# Patient Record
Sex: FEMALE | Race: WHITE | HISPANIC OR LATINO | Employment: PART TIME | URBAN - METROPOLITAN AREA
[De-identification: names, ages, dates, MRNs, and addresses within clinical notes are randomized per-mention and may not be internally consistent; named-entity substitution may affect disease eponyms.]

---

## 2017-02-08 ENCOUNTER — TELEPHONE (OUTPATIENT)
Dept: MEDICAL GROUP | Facility: PHYSICIAN GROUP | Age: 37
End: 2017-02-08

## 2017-02-08 NOTE — TELEPHONE ENCOUNTER
1. Caller Name: Patient                                         Call Back Number: [phone] (home)         Patient approves a detailed voicemail message: yes    2. SPECIFIC Action To Be Taken: Order diagnostic mammogram and Ultra sound this is a follow up from a year ago    3. Diagnosis/Clinical Reason for Request: Follow up    4. Specialty & Provider Name/Lab/Imaging Location: [facility]    5. Is appointment scheduled for requested order/referral: no    Patient informed they will receive a return phone call from the office ONLY if there are any questions before processing their request. Advised to call back if they haven't received a call from the referral department in 5 days.

## 2017-03-03 NOTE — TELEPHONE ENCOUNTER
Was the patient seen in the last year in this department? Yes     Does patient have an active prescription for medications requested? Yes     Received Request Via: Patient     Last office visit 02/18/16  Last labs 02/18/16

## 2017-03-06 RX ORDER — MONTELUKAST SODIUM 10 MG/1
10 TABLET ORAL DAILY
Qty: 30 TAB | Refills: 11 | OUTPATIENT
Start: 2017-03-06

## 2017-03-07 ENCOUNTER — HOSPITAL ENCOUNTER (OUTPATIENT)
Dept: RADIOLOGY | Facility: MEDICAL CENTER | Age: 37
End: 2017-03-07
Attending: FAMILY MEDICINE
Payer: COMMERCIAL

## 2017-03-07 DIAGNOSIS — N63.0 BREAST LUMP IN FEMALE: ICD-10-CM

## 2017-03-07 PROCEDURE — G0204 DX MAMMO INCL CAD BI: HCPCS

## 2017-03-07 PROCEDURE — 76642 ULTRASOUND BREAST LIMITED: CPT | Mod: LT

## 2017-03-21 ENCOUNTER — OFFICE VISIT (OUTPATIENT)
Dept: MEDICAL GROUP | Facility: PHYSICIAN GROUP | Age: 37
End: 2017-03-21
Payer: COMMERCIAL

## 2017-03-21 VITALS
OXYGEN SATURATION: 98 % | HEIGHT: 65 IN | HEART RATE: 76 BPM | WEIGHT: 147 LBS | TEMPERATURE: 98.2 F | SYSTOLIC BLOOD PRESSURE: 100 MMHG | BODY MASS INDEX: 24.49 KG/M2 | RESPIRATION RATE: 16 BRPM | DIASTOLIC BLOOD PRESSURE: 60 MMHG

## 2017-03-21 DIAGNOSIS — R10.9 RIGHT FLANK PAIN: ICD-10-CM

## 2017-03-21 DIAGNOSIS — N63.0 BREAST LUMP IN FEMALE: ICD-10-CM

## 2017-03-21 DIAGNOSIS — J30.1 NON-SEASONAL ALLERGIC RHINITIS DUE TO POLLEN: ICD-10-CM

## 2017-03-21 DIAGNOSIS — J30.1 ALLERGIC RHINITIS DUE TO POLLEN, UNSPECIFIED RHINITIS SEASONALITY: ICD-10-CM

## 2017-03-21 PROCEDURE — 99214 OFFICE O/P EST MOD 30 MIN: CPT | Performed by: FAMILY MEDICINE

## 2017-03-21 RX ORDER — MONTELUKAST SODIUM 10 MG/1
10 TABLET ORAL DAILY
Qty: 30 TAB | Refills: 11 | Status: CANCELLED | OUTPATIENT
Start: 2017-03-21

## 2017-03-21 RX ORDER — MONTELUKAST SODIUM 10 MG/1
10 TABLET ORAL DAILY
Qty: 30 TAB | Refills: 11 | Status: SHIPPED | OUTPATIENT
Start: 2017-03-21 | End: 2019-10-16 | Stop reason: SDUPTHER

## 2017-03-21 ASSESSMENT — ENCOUNTER SYMPTOMS
EYES NEGATIVE: 1
COUGH: 0
PALPITATIONS: 0
CARDIOVASCULAR NEGATIVE: 1
CHILLS: 0
GASTROINTESTINAL NEGATIVE: 1
FEVER: 0
BACK PAIN: 1
CONSTIPATION: 0
HEADACHES: 0
PSYCHIATRIC NEGATIVE: 1
NEUROLOGICAL NEGATIVE: 1
RESPIRATORY NEGATIVE: 1
DIZZINESS: 0
HEMOPTYSIS: 0
MYALGIAS: 1
NECK PAIN: 0
CONSTITUTIONAL NEGATIVE: 1

## 2017-03-21 NOTE — PROGRESS NOTES
Subjective:      Ana Luz is a 36 y.o. female who presents with Medication Refill            HPI Comments: 1. Non-seasonal allergic rhinitis due to pollen  Doing well on current meds  - montelukast (SINGULAIR) 10 MG Tab; Take 1 Tab by mouth every day.  Dispense: 30 Tab; Refill: 11    2. Breast lump in female  Had recent mammo and usg and stable appearance and rec 2 year f/u    3. Right flank pain  Pain in the right flank at times and wants scan to r/o stones  - US-RENAL; Future    No past medical history on file.  Past Surgical History:    BREAST BIOPSY                                    2009            Comment:benign    TUBAL COAGULATION LAPAROSCOPIC BILATERAL         2009          US-NEEDLE CORE BX-BREAST PANEL                                 Smoking Status: Never Smoker                      Alcohol Use: No              Review of patient's family history indicates:    Hypertension                   Mother                    Heart Disease                  Father                    Hypertension                   Father                    Cancer                         Maternal Aunt               Comment: breast      Current outpatient prescriptions: •  montelukast (SINGULAIR) 10 MG Tab, Take 1 Tab by mouth every day., Disp: 30 Tab, Rfl: 11•  ciprofloxacin (CIPRO) 500 MG Tab, Take 1 Tab by mouth 2 times a day., Disp: 20 Tab, Rfl: 0    Assessment/plan: diagnoses listed as above in problem list. Patient will continue with current medications/diet/lifestyle modifications    Patient will continue with current medication regimen, advised on taking meds every day, f/u in 3 mo.            Review of Systems   Constitutional: Negative.  Negative for fever and chills.        No past medical history on file.  Past Surgical History:    BREAST BIOPSY                                    2009            Comment:benign    TUBAL COAGULATION LAPAROSCOPIC BILATERAL         2009          US-NEEDLE CORE BX-BREAST PANEL                 "                 Smoking Status: Never Smoker                      Alcohol Use: No              Review of patient's family history indicates:    Hypertension                   Mother                    Heart Disease                  Father                    Hypertension                   Father                    Cancer                         Maternal Aunt               Comment: breast     HENT: Negative.    Eyes: Negative.    Respiratory: Negative.  Negative for cough and hemoptysis.    Cardiovascular: Negative.  Negative for chest pain and palpitations.   Gastrointestinal: Negative.  Negative for constipation.   Genitourinary: Negative.  Negative for dysuria and urgency.   Musculoskeletal: Positive for myalgias and back pain. Negative for neck pain.   Skin: Negative.  Negative for rash.   Neurological: Negative.  Negative for dizziness and headaches.   Endo/Heme/Allergies: Negative.    Psychiatric/Behavioral: Negative.  Negative for suicidal ideas.          Objective:     /60 mmHg  Pulse 76  Temp(Src) 36.8 °C (98.2 °F)  Resp 16  Ht 1.651 m (5' 5\")  Wt 66.679 kg (147 lb)  BMI 24.46 kg/m2  SpO2 98%     Physical Exam   Constitutional: She is oriented to person, place, and time. No distress.   HENT:   Head: Normocephalic and atraumatic.   Right Ear: External ear normal.   Left Ear: External ear normal.   Nose: Nose normal.   Mouth/Throat: Oropharynx is clear and moist. No oropharyngeal exudate.   Eyes: Pupils are equal, round, and reactive to light. Right eye exhibits no discharge. Left eye exhibits no discharge. No scleral icterus.   Neck: Normal range of motion. Neck supple. No JVD present. No tracheal deviation present. No thyromegaly present.   Cardiovascular: Normal rate, regular rhythm, normal heart sounds and intact distal pulses.  Exam reveals no gallop and no friction rub.    No murmur heard.  Pulmonary/Chest: Effort normal and breath sounds normal. No stridor. No respiratory distress. She has " no wheezes. She has no rales. She exhibits no tenderness.   Abdominal: Soft. She exhibits no distension. There is no tenderness.   Lymphadenopathy:     She has no cervical adenopathy.   Neurological: She is alert and oriented to person, place, and time.   Skin: Skin is warm and dry. She is not diaphoretic.   Psychiatric: Judgment normal.   Nursing note and vitals reviewed.              Assessment/Plan:     1. Non-seasonal allergic rhinitis due to pollen    - montelukast (SINGULAIR) 10 MG Tab; Take 1 Tab by mouth every day.  Dispense: 30 Tab; Refill: 11

## 2017-03-22 ENCOUNTER — TELEPHONE (OUTPATIENT)
Dept: MEDICAL GROUP | Facility: PHYSICIAN GROUP | Age: 37
End: 2017-03-22

## 2017-03-23 NOTE — TELEPHONE ENCOUNTER
----- Message from Guillermo Chahal M.D. sent at 3/22/2017 11:16 AM PDT -----  Labs ok no new meds needed

## 2017-03-23 NOTE — TELEPHONE ENCOUNTER
Phone Number Called: [phone] (home)       Message: Patient informed of results    Left Message for patient to call back: no

## 2017-03-24 ENCOUNTER — TELEPHONE (OUTPATIENT)
Dept: MEDICAL GROUP | Facility: PHYSICIAN GROUP | Age: 37
End: 2017-03-24

## 2017-03-24 NOTE — TELEPHONE ENCOUNTER
Phone Number Called: [phone] (home)       Message: Pt informed of results    Left Message for patient to call back: no

## 2017-03-29 ENCOUNTER — TELEPHONE (OUTPATIENT)
Dept: MEDICAL GROUP | Facility: PHYSICIAN GROUP | Age: 37
End: 2017-03-29

## 2017-03-29 NOTE — TELEPHONE ENCOUNTER
Phone Number Called: [phone] (home)       Message: Patient informed of results    Left Message for patient to call back: no

## 2017-03-29 NOTE — TELEPHONE ENCOUNTER
----- Message from Lakia Alatorre PA-C sent at 3/29/2017  9:15 AM PDT -----  I reviewed labs. Everything is within normal limits and looks good. Return for follow up as scheduled.

## 2017-04-04 ENCOUNTER — TELEPHONE (OUTPATIENT)
Dept: MEDICAL GROUP | Facility: PHYSICIAN GROUP | Age: 37
End: 2017-04-04

## 2017-04-04 NOTE — TELEPHONE ENCOUNTER
Phone Number Called: [phone] (home)       Message: Informed pt that tests were normal      Left Message for patient to call back: no

## 2017-10-31 PROBLEM — B00.2 ORAL HERPES SIMPLEX INFECTION: Status: ACTIVE | Noted: 2017-10-31

## 2017-10-31 PROBLEM — H10.10 ALLERGIC RHINOCONJUNCTIVITIS: Status: ACTIVE | Noted: 2017-10-31

## 2017-10-31 PROBLEM — J30.9 ALLERGIC RHINOCONJUNCTIVITIS: Status: ACTIVE | Noted: 2017-10-31

## 2018-11-16 PROBLEM — D53.9 MACROCYTIC ANEMIA: Status: ACTIVE | Noted: 2018-11-16

## 2019-10-07 PROBLEM — Z53.21 PATIENT LEFT WITHOUT BEING SEEN: Status: ACTIVE | Noted: 2019-10-07

## 2019-10-07 PROBLEM — Z88.9 H/O SEASONAL ALLERGIES: Status: ACTIVE | Noted: 2019-10-07

## 2019-10-07 PROBLEM — J31.0 RHINITIS: Status: ACTIVE | Noted: 2019-10-07

## 2020-09-26 ENCOUNTER — HOSPITAL ENCOUNTER (OUTPATIENT)
Facility: MEDICAL CENTER | Age: 40
End: 2020-09-26
Payer: COMMERCIAL

## 2020-09-26 LAB
COVID ORDER STATUS COVID19: NORMAL
SARS-COV-2 RNA RESP QL NAA+PROBE: NOTDETECTED
SPECIMEN SOURCE: NORMAL

## 2022-02-17 PROBLEM — B35.1 ONYCHOMYCOSIS: Status: ACTIVE | Noted: 2022-02-17

## 2022-02-17 PROBLEM — Z88.9 H/O SEASONAL ALLERGIES: Status: RESOLVED | Noted: 2019-10-07 | Resolved: 2022-02-17

## 2022-02-17 PROBLEM — E78.00 PURE HYPERCHOLESTEROLEMIA: Status: ACTIVE | Noted: 2022-02-17

## 2022-02-17 PROBLEM — J31.0 RHINITIS: Status: RESOLVED | Noted: 2019-10-07 | Resolved: 2022-02-17

## 2022-02-17 PROBLEM — D53.9 MACROCYTIC ANEMIA: Status: RESOLVED | Noted: 2018-11-16 | Resolved: 2022-02-17

## 2022-02-17 PROBLEM — B00.2 ORAL HERPES SIMPLEX INFECTION: Status: RESOLVED | Noted: 2017-10-31 | Resolved: 2022-02-17

## 2022-02-17 PROBLEM — Z53.21 PATIENT LEFT WITHOUT BEING SEEN: Status: RESOLVED | Noted: 2019-10-07 | Resolved: 2022-02-17
